# Patient Record
Sex: FEMALE | Race: WHITE | NOT HISPANIC OR LATINO | Employment: OTHER | URBAN - METROPOLITAN AREA
[De-identification: names, ages, dates, MRNs, and addresses within clinical notes are randomized per-mention and may not be internally consistent; named-entity substitution may affect disease eponyms.]

---

## 2023-08-03 ENCOUNTER — PATIENT MESSAGE (OUTPATIENT)
Dept: RESEARCH | Facility: HOSPITAL | Age: 73
End: 2023-08-03

## 2024-06-03 ENCOUNTER — OFFICE VISIT (OUTPATIENT)
Dept: PULMONOLOGY | Facility: CLINIC | Age: 74
End: 2024-06-03
Payer: MEDICARE

## 2024-06-03 ENCOUNTER — CLINICAL SUPPORT (OUTPATIENT)
Dept: PULMONOLOGY | Facility: CLINIC | Age: 74
End: 2024-06-03
Payer: MEDICARE

## 2024-06-03 ENCOUNTER — HOSPITAL ENCOUNTER (OUTPATIENT)
Dept: RADIOLOGY | Facility: HOSPITAL | Age: 74
Discharge: HOME | End: 2024-06-03
Attending: PHYSICIAN ASSISTANT
Payer: MEDICARE

## 2024-06-03 VITALS
SYSTOLIC BLOOD PRESSURE: 118 MMHG | OXYGEN SATURATION: 97 % | BODY MASS INDEX: 31 KG/M2 | RESPIRATION RATE: 20 BRPM | WEIGHT: 168.44 LBS | DIASTOLIC BLOOD PRESSURE: 76 MMHG | HEIGHT: 62 IN | HEART RATE: 82 BPM

## 2024-06-03 VITALS — HEIGHT: 62 IN | WEIGHT: 168.44 LBS | BODY MASS INDEX: 31 KG/M2

## 2024-06-03 DIAGNOSIS — R09.02 EXERCISE HYPOXEMIA: ICD-10-CM

## 2024-06-03 DIAGNOSIS — J30.89 SEASONAL ALLERGIC RHINITIS DUE TO OTHER ALLERGIC TRIGGER: ICD-10-CM

## 2024-06-03 DIAGNOSIS — J84.9 INTERSTITIAL PULMONARY DISEASE, UNSPECIFIED: ICD-10-CM

## 2024-06-03 DIAGNOSIS — J40 BRONCHITIS: ICD-10-CM

## 2024-06-03 DIAGNOSIS — J84.10 PULMONARY INTERSTITIAL FIBROSIS: ICD-10-CM

## 2024-06-03 DIAGNOSIS — J84.9 INTERSTITIAL PULMONARY DISEASE, UNSPECIFIED: Primary | ICD-10-CM

## 2024-06-03 PROCEDURE — 99214 OFFICE O/P EST MOD 30 MIN: CPT | Mod: 25,S$PBB,, | Performed by: INTERNAL MEDICINE

## 2024-06-03 PROCEDURE — 71046 X-RAY EXAM CHEST 2 VIEWS: CPT | Mod: 26,,, | Performed by: RADIOLOGY

## 2024-06-03 PROCEDURE — 99214 OFFICE O/P EST MOD 30 MIN: CPT | Mod: PBBFAC,25,27 | Performed by: INTERNAL MEDICINE

## 2024-06-03 PROCEDURE — 99211 OFF/OP EST MAY X REQ PHY/QHP: CPT | Mod: PBBFAC,25

## 2024-06-03 PROCEDURE — 99999 PR PBB SHADOW E&M-EST. PATIENT-LVL IV: CPT | Mod: PBBFAC,,, | Performed by: INTERNAL MEDICINE

## 2024-06-03 PROCEDURE — 94618 PULMONARY STRESS TESTING: CPT | Mod: PBBFAC

## 2024-06-03 PROCEDURE — 71046 X-RAY EXAM CHEST 2 VIEWS: CPT | Mod: TC

## 2024-06-03 PROCEDURE — 99999 PR PBB SHADOW E&M-EST. PATIENT-LVL I: CPT | Mod: PBBFAC,,,

## 2024-06-03 RX ORDER — ALBUTEROL SULFATE 90 UG/1
2 AEROSOL, METERED RESPIRATORY (INHALATION) EVERY 6 HOURS PRN
Qty: 18 G | Refills: 11 | Status: SHIPPED | OUTPATIENT
Start: 2024-06-03

## 2024-06-03 RX ORDER — AZELASTINE 1 MG/ML
2 SPRAY, METERED NASAL 2 TIMES DAILY
Qty: 30 ML | Refills: 11 | Status: SHIPPED | OUTPATIENT
Start: 2024-06-03 | End: 2025-06-03

## 2024-06-03 NOTE — PROCEDURES
"[facility] [place]  Six Minute Walk     SUMMARY     Ordering Provider:    Interpreting Provider:   Performing nurse/tech/RT: COLIN Liu RRT  Diagnosis:  (Exercise Hypoxemia, Interstitial Pulmonary Disease)  Height: 5' 2" (157.5 cm)  Weight: 76.4 kg (168 lb 6.9 oz)  BMI (Calculated): 30.8   Patient Race:             Phase Oxygen Assessment Supplemental O2 Heart   Rate Blood Pressure Katie Dyspnea Scale Rating   Resting 97 % Room Air 82 bpm 118/76 0   Exercise        Minute        1 97 % Room Air 108 bpm     2 96 % Room Air 119 bpm     3 96 % Room Air 120 bpm     4 96 % Room Air 119 bpm     5 96 % Room Air 117 bpm     6  96 % Room Air 120 bpm 151/87 1   Recovery        Minute        1 97 % Room Air 104 bpm     2 97 % Room Air 97 bpm     3 98 % Room Air 96 bpm     4 97 % Room Air 100 bpm 147/73 0     Six Minute Walk Summary  6MWT Status: completed without stopping  Patient Reported: Dyspnea     Interpretation:  Did the patient stop or pause?: No                                         Total Time Walked (Calculated): 360 seconds  Final Partial Lap Distance (feet): 175 feet  Total Distance Meters (Calculated): 419.1 meters  Predicted Distance Meters (Calculated): 390.87 meters  Percentage of Predicted (Calculated): 107.22  Peak VO2 (Calculated): 16.55  Mets: 4.73  Has The Patient Had a Previous Six Minute Walk Test?: No       Previous 6MWT Results  Has The Patient Had a Previous Six Minute Walk Test?: No    Interpretation:  Total distance walked in six minutes is normal indicating normal functional capacity.   Patient did not meet criteria for oxygen prescription. Clinical correlation suggested.    [] Mild exercise-induced hypoxemia described as an arterial oxygen saturation of 93-95% (or 3-4% less than at rest),  [] Moderate exercise-induced hypoxemia as 89-93%  [] Severe exercise induced hypoxemia as < 89% O2 saturation.  Medicare Criteria for oxygen prescription comments:   When " arterial oxygen saturation is at or below 88% during exercise on room air (severe exercise induced hypoxemia) then the patient falls under Medicare Group 1 criteria for supplemental oxygen prescription.  Details about Medicare Group Criteria coverage can be found at http://www.cms.hhs.gov/manuals/downloads/     Mahendra Steele MD

## 2024-06-03 NOTE — PROGRESS NOTES
Subjective:     Patient ID: Jaymie Rawls is a 73 y.o. female.    Chief Complaint:      HPI 73 y.o.  nonsmoker with history of hypertension, osteopenia and dyspnea.  Secondary smoke exposure throughout her life - none recently    Dyspnea  Patient complains of shortness of breath. Symptoms occur after one flight stairs, with one block walking. Symptoms began 4 years ago, gradually worsening since. Associated symptoms include  difficulty breathing, dry cough, dyspnea on exertion, productive cough, shortness of breath, and sputum production. She denies chest pain, located left chest. She does not have had recent travel. Weight has been stable. Symptoms are exacerbated by any exercise and moderate activity. Symptoms are alleviated by rest.   C/o phlegm production  Chest aches  Coughing  Mucinex and antibiotics have helped    Seen by Dr. Kasper in the past at [facility]   Possible interstitial lung disease - unable to locate records    Past Medical History:   Diagnosis Date    Allergy to environmental factors     Unknown, patient has Epi pen    Back problem     Bronchitis     Chicken pox     COVID-19 2021    & 2023    Essential (primary) hypertension     Fibula fracture     skiing accident (approx 50 yrs ago)    Hemorrhoids     Hives     HPV (human papilloma virus) infection     detected in a blood test 3 yrs ago, non significnat type, no treatment needed    HTN (hypertension)     Measles without complication     Mumps without complication     Osteopenia     Pneumonia, unspecified organism     Recurrent UTI     Unspecified cataract     Unspecified chronic bronchitis      Past Surgical History:   Procedure Laterality Date    ADENOIDECTOMY      age 7    dental inplants      EYE SURGERY      TONSILLECTOMY      age 7     Review of patient's allergies indicates:   Allergen Reactions    Statins-hmg-coa reductase inhibitors      Leg/ Muscle Cramps     Current Outpatient Medications on File  Prior to Visit   Medication Sig Dispense Refill    amoxicillin-clavulanate 875-125mg (AUGMENTIN) 875-125 mg per tablet Take 1 tablet by mouth every 12 (twelve) hours. 20 tablet 0    benzonatate (TESSALON) 100 MG capsule Take 1 capsule (100 mg total) by mouth 3 (three) times daily as needed for Cough. 20 capsule 0    doxycycline (VIBRAMYCIN) 100 MG Cap Take 1 capsule (100 mg total) by mouth 2 (two) times daily. 20 capsule 0    ezetimibe (ZETIA) 10 mg tablet Take 10 mg by mouth every morning.      hydroCHLOROthiazide (HYDRODIURIL) 25 MG tablet Take 25 mg by mouth once daily.      inhalation spacing device Use as directed for inhalation. 1 each 0    losartan (COZAAR) 100 MG tablet Take 100 mg by mouth once daily.      promethazine-dextromethorphan (PROMETHAZINE-DM) 6.25-15 mg/5 mL Syrp Take 5 mLs by mouth every 6 (six) hours as needed (cough). 240 mL 0    triamcinolone acetonide 0.1% (KENALOG) 0.1 % cream Apply daily to legs as needed for itching 80 g 0    [DISCONTINUED] albuterol (VENTOLIN HFA) 90 mcg/actuation inhaler Inhale 2 puffs into the lungs every 6 (six) hours as needed for Wheezing. Rescue please provide a spacer 18 g 0    [DISCONTINUED] fluticasone propionate (FLONASE) 50 mcg/actuation nasal spray 1 spray (50 mcg total) by Each Nostril route once daily. 18.2 mL 0     No current facility-administered medications on file prior to visit.     Social History     Socioeconomic History    Marital status:    Tobacco Use    Smoking status: Never    Smokeless tobacco: Never   Substance and Sexual Activity    Alcohol use: Yes     Comment: wine    Drug use: Never    Sexual activity: Yes     Partners: Male     Birth control/protection: None     Family History   Problem Relation Name Age of Onset    Diabetes Mother      Hypertension Mother      Colon cancer Mother      Heart disease Mother      Ulcers Mother      Diabetes Father      Hypertension Father      Cancer Father      Allergies Father      Anemia Father    "   Depression Father      Hyperlipidemia Father      Kidney disease Father      Gout Father      Epilepsy Sister      Migraines Maternal Aunt      Glaucoma Maternal Grandmother      Dementia Paternal Grandfather         Review of Systems   Constitutional:  Positive for fatigue. Negative for fever.   HENT:  Positive for postnasal drip, rhinorrhea and congestion.    Eyes:  Negative for redness and itching.   Respiratory:  Positive for cough, sputum production, shortness of breath, dyspnea on extertion, use of rescue inhaler and Paroxysmal Nocturnal Dyspnea.    Cardiovascular:  Negative for chest pain, palpitations and leg swelling.   Genitourinary:  Negative for difficulty urinating and hematuria.   Endocrine:  Negative for cold intolerance and heat intolerance.    Skin:  Negative for rash.   Gastrointestinal:  Negative for nausea and abdominal pain.   Neurological:  Negative for dizziness, syncope, weakness and light-headedness.   Hematological:  Negative for adenopathy. Does not bruise/bleed easily.   Psychiatric/Behavioral:  Negative for sleep disturbance. The patient is not nervous/anxious.        Objective:      /76   Pulse 82   Resp 20   Ht 5' 2" (1.575 m)   Wt 76.4 kg (168 lb 6.9 oz)   SpO2 97%   BMI 30.81 kg/m²   Physical Exam  Vitals and nursing note reviewed.   Constitutional:       Appearance: She is well-developed.   HENT:      Head: Normocephalic and atraumatic.      Nose: Nose normal.      Mouth/Throat:      Pharynx: No oropharyngeal exudate.   Eyes:      Conjunctiva/sclera: Conjunctivae normal.      Pupils: Pupils are equal, round, and reactive to light.   Neck:      Thyroid: No thyromegaly.      Vascular: No JVD.      Trachea: No tracheal deviation.   Cardiovascular:      Rate and Rhythm: Normal rate and regular rhythm.      Heart sounds: Normal heart sounds.   Pulmonary:      Effort: Pulmonary effort is normal. No respiratory distress.      Breath sounds: Examination of the right-lower " "field reveals wheezing. Examination of the left-lower field reveals wheezing. Decreased breath sounds and wheezing present. No rhonchi or rales.   Chest:      Chest wall: No tenderness.   Abdominal:      General: Bowel sounds are normal.      Palpations: Abdomen is soft.   Musculoskeletal:         General: Normal range of motion.      Cervical back: Neck supple.   Lymphadenopathy:      Cervical: No cervical adenopathy.   Skin:     General: Skin is warm and dry.   Neurological:      Mental Status: She is alert and oriented to person, place, and time.       Personal Diagnostic Review  Chest x-ray: stable          6/3/2024     3:59 PM   Pulmonary Studies Review   Ordering Provider    Interpreting Provider    Performing nurse/tech/RT COLIN Liu, RRT   Height 5' 2" (1.575 m)   Weight 76.4 kg (168 lb 6.9 oz)   BMI (Calculated) 30.8   Predicted Distance 248.56   Patient Race    6MWT Status completed without stopping   Patient Reported Dyspnea   Was O2 used? No   6MW Distance walked (feet) 1375 feet   Distance walked (meters) 419.1 meters   Did patient stop? No   Type of assistive device(s) used? no assistive devices   Is extra documentation required for this patient? Yes   Oxygen Saturation 97 %   Supplemental Oxygen Room Air   Heart Rate 82 bpm   Blood Pressure 118/76   Katie Dyspnea Rating  nothing at all   Oxygen Saturation 96 %   Supplemental Oxygen Room Air   Heart Rate 120 bpm   Blood Pressure 151/87   Katie Dyspnea Rating  very light   Recovery Time (seconds) 240 seconds   Oxygen Saturation 97 %   Supplemental Oxygen Room Air   Heart Rate 100 bpm   Blood Pressure 147/73   Katie Dyspnea Rating  nothing at all   Is procedure ready for interpretation? Yes   Did the patient stop or pause? No   Total Time Walked (Calculated) 360 seconds   Total Laps Walked 6   Final Partial Lap Distance (feet) 175 feet   Total Distance Feet (Calculated) 1375 feet   Total Distance Meters (Calculated) 419.1 meters " "  Predicted Distance Meters (Calculated) 390.87 meters   Percentage of Predicted (Calculated) 107.22   Peak VO2 (Calculated) 16.55   Mets 4.73   Has The Patient Had a Previous Six Minute Walk Test? No   Oxygen Qualification? No       X-Ray Chest PA And Lateral  Narrative: EXAM: XR CHEST PA AND LATERAL    CLINICAL HISTORY:   [J40]-Bronchitis, not specified as acute or chronic./[R05.2]-Subacute cough.    COMPARISON:  None available.    FINDINGS:  2 view chest.  Mediastinal silhouette is within normal limits.  Prominent bronchovascular structures.  No consolidation.  No pneumothorax or pleural effusion.  No acute osseous finding.  Impression: Prominent bronchovascular structures which may represent chronic lung disease and/or bronchitis.    Finalized on: 6/3/2024 2:41 PM By:  Martínez Thompson MD  BRR# [id number]      2024-06-03 14:43:[id number]    [facility]      Office Spirometry Results:         6/3/2024     3:59 PM 6/3/2024     2:40 PM 5/28/2024     9:23 AM 4/4/2024     8:16 AM 6/29/2023    11:51 AM 6/23/2022    10:48 AM   Pulmonary Function Tests   SpO2  97 % 96 % 97 %     Ordering Provider         Performing nurse/tech/RT COLIN Liu, RRT        Height 5' 2" (1.575 m) 5' 2" (1.575 m) 5' 2" (1.575 m) 5' 2" (1.575 m) 5' 2" (1.575 m) 5' 2" (1.575 m)   Weight 76.4 kg (168 lb 6.9 oz) 76.4 kg (168 lb 6.9 oz) 76.6 kg (168 lb 12.8 oz) 76.7 kg (169 lb 3.2 oz) 75.8 kg (167 lb) 73 kg (161 lb)   BMI (Calculated) 30.8 30.8 30.9 30.9 30.5 29.4   Patient Race         6MWT Status completed without stopping        Patient Reported Dyspnea        Was O2 used? No        6MW Distance walked (feet) 1375 feet        Distance walked (meters) 419.1 meters        Did patient stop? No        Type of assistive device(s) used? no assistive devices        Oxygen Saturation 97 %        Supplemental Oxygen Room Air        Heart Rate 82 bpm        Blood Pressure 118/76        Katie Dyspnea Rating  nothing at all        Oxygen Saturation 96 %      " "  Supplemental Oxygen Room Air        Heart Rate 120 bpm        Blood Pressure 151/87        Katie Dyspnea Rating  very light        Recovery Time (seconds) 240 seconds        Oxygen Saturation 97 %        Supplemental Oxygen Room Air        Heart Rate 100 bpm        Blood Pressure 147/73        Katie Dyspnea Rating  nothing at all        Is procedure ready for interpretation? Yes        Oxygen Qualification? No              6/3/2024     3:59 PM   Pulmonary Studies Review   Ordering Provider    Interpreting Provider    Performing nurse/tech/RT COLIN Liu, RRT   Height 5' 2" (1.575 m)   Weight 76.4 kg (168 lb 6.9 oz)   BMI (Calculated) 30.8   Predicted Distance 248.56   Patient Race    6MWT Status completed without stopping   Patient Reported Dyspnea   Was O2 used? No   6MW Distance walked (feet) 1375 feet   Distance walked (meters) 419.1 meters   Did patient stop? No   Type of assistive device(s) used? no assistive devices   Is extra documentation required for this patient? Yes   Oxygen Saturation 97 %   Supplemental Oxygen Room Air   Heart Rate 82 bpm   Blood Pressure 118/76   Katie Dyspnea Rating  nothing at all   Oxygen Saturation 96 %   Supplemental Oxygen Room Air   Heart Rate 120 bpm   Blood Pressure 151/87   Katie Dyspnea Rating  very light   Recovery Time (seconds) 240 seconds   Oxygen Saturation 97 %   Supplemental Oxygen Room Air   Heart Rate 100 bpm   Blood Pressure 147/73   Katie Dyspnea Rating  nothing at all   Is procedure ready for interpretation? Yes   Did the patient stop or pause? No   Total Time Walked (Calculated) 360 seconds   Total Laps Walked 6   Final Partial Lap Distance (feet) 175 feet   Total Distance Feet (Calculated) 1375 feet   Total Distance Meters (Calculated) 419.1 meters   Predicted Distance Meters (Calculated) 390.87 meters   Percentage of Predicted (Calculated) 107.22   Peak VO2 (Calculated) 16.55   Mets 4.73   Has The Patient Had a Previous Six Minute Walk Test? " "No   Oxygen Qualification? No           No results found for this or any previous visit (from the past 336 hour(s)).  [facility] [place]  Six Minute Walk      SUMMARY      Ordering Provider:    Interpreting Provider:   Performing nurse/tech/RT: COLIN Liu RRT  Diagnosis:  (Exercise Hypoxemia, Interstitial Pulmonary Disease)  Height: 5' 2" (157.5 cm)  Weight: 76.4 kg (168 lb 6.9 oz)  BMI (Calculated): 30.8              Patient Race:                                                                Phase Oxygen Assessment Supplemental O2 Heart   Rate Blood Pressure Katie Dyspnea Scale Rating   Resting 97 % Room Air 82 bpm 118/76 0   Exercise             Minute             1 97 % Room Air 108 bpm       2 96 % Room Air 119 bpm       3 96 % Room Air 120 bpm       4 96 % Room Air 119 bpm       5 96 % Room Air 117 bpm       6  96 % Room Air 120 bpm 151/87 1   Recovery             Minute             1 97 % Room Air 104 bpm       2 97 % Room Air 97 bpm       3 98 % Room Air 96 bpm       4 97 % Room Air 100 bpm 147/73 0      Six Minute Walk Summary  6MWT Status: completed without stopping  Patient Reported: Dyspnea         Interpretation:  Did the patient stop or pause?: No  Total Time Walked (Calculated): 360 seconds  Final Partial Lap Distance (feet): 175 feet  Total Distance Meters (Calculated): 419.1 meters  Predicted Distance Meters (Calculated): 390.87 meters  Percentage of Predicted (Calculated): 107.22  Peak VO2 (Calculated): 16.55  Mets: 4.73  Has The Patient Had a Previous Six Minute Walk Test?: No     Previous 6MWT Results  Has The Patient Had a Previous Six Minute Walk Test?: No  Interpretation:  Total distance walked in six minutes is normal indicating normal functional capacity.   Patient did not meet criteria for oxygen prescription. Clinical correlation suggested.    Medicare Criteria for oxygen prescription comments:   When arterial oxygen saturation is at or below 88% " during exercise on room air (severe exercise induced hypoxemia) then the patient falls under Medicare Group 1 criteria for supplemental oxygen prescription.  Details about Medicare Group Criteria coverage can be found at http://www.cms.[facility].gov/manuals/downloads/     Mahendra Steele MD        Assessment:            Interstitial pulmonary disease, unspecified  -     CT Chest Without Contrast; Future; Expected date: 06/03/2024  -     Complete PFT with bronchodilator; Future; Expected date: 06/03/2024  -     Six Minute Walk Test to qualify for Home Oxygen; Future    Pulmonary interstitial fibrosis  -     Ambulatory referral/consult to Pulmonology    Exercise hypoxemia  -     Six Minute Walk Test to qualify for Home Oxygen; Future    Bronchitis  -     albuterol (VENTOLIN HFA) 90 mcg/actuation inhaler; Inhale 2 puffs into the lungs every 6 (six) hours as needed for Wheezing. Rescue please provide a spacer  Dispense: 18 g; Refill: 11    Seasonal allergic rhinitis due to other allergic trigger  -     azelastine (ASTELIN) 137 mcg (0.1 %) nasal spray; 2 sprays (274 mcg total) by Nasal route 2 (two) times daily.  Dispense: 30 mL; Refill: 11          Outpatient Encounter Medications as of 6/3/2024   Medication Sig Dispense Refill    amoxicillin-clavulanate 875-125mg (AUGMENTIN) 875-125 mg per tablet Take 1 tablet by mouth every 12 (twelve) hours. 20 tablet 0    benzonatate (TESSALON) 100 MG capsule Take 1 capsule (100 mg total) by mouth 3 (three) times daily as needed for Cough. 20 capsule 0    doxycycline (VIBRAMYCIN) 100 MG Cap Take 1 capsule (100 mg total) by mouth 2 (two) times daily. 20 capsule 0    ezetimibe (ZETIA) 10 mg tablet Take 10 mg by mouth every morning.      hydroCHLOROthiazide (HYDRODIURIL) 25 MG tablet Take 25 mg by mouth once daily.      inhalation spacing device Use as directed for inhalation. 1 each 0    losartan (COZAAR) 100 MG tablet Take 100 mg by mouth once daily.      promethazine-dextromethorphan  (PROMETHAZINE-DM) 6.25-15 mg/5 mL Syrp Take 5 mLs by mouth every 6 (six) hours as needed (cough). 240 mL 0    triamcinolone acetonide 0.1% (KENALOG) 0.1 % cream Apply daily to legs as needed for itching 80 g 0    [DISCONTINUED] albuterol (VENTOLIN HFA) 90 mcg/actuation inhaler Inhale 2 puffs into the lungs every 6 (six) hours as needed for Wheezing. Rescue please provide a spacer 18 g 0    [DISCONTINUED] fluticasone propionate (FLONASE) 50 mcg/actuation nasal spray 1 spray (50 mcg total) by Each Nostril route once daily. 18.2 mL 0    albuterol (VENTOLIN HFA) 90 mcg/actuation inhaler Inhale 2 puffs into the lungs every 6 (six) hours as needed for Wheezing. Rescue please provide a spacer 18 g 11    azelastine (ASTELIN) 137 mcg (0.1 %) nasal spray 2 sprays (274 mcg total) by Nasal route 2 (two) times daily. 30 mL 11     No facility-administered encounter medications on file as of 6/3/2024.     Plan:       Requested Prescriptions     Signed Prescriptions Disp Refills    albuterol (VENTOLIN HFA) 90 mcg/actuation inhaler 18 g 11     Sig: Inhale 2 puffs into the lungs every 6 (six) hours as needed for Wheezing. Rescue please provide a spacer    azelastine (ASTELIN) 137 mcg (0.1 %) nasal spray 30 mL 11     Si sprays (274 mcg total) by Nasal route 2 (two) times daily.     Problem List Items Addressed This Visit    None  Visit Diagnoses       Interstitial pulmonary disease, unspecified    -  Primary    Relevant Orders    CT Chest Without Contrast    Complete PFT with bronchodilator    Six Minute Walk Test to qualify for Home Oxygen (Completed)    Pulmonary interstitial fibrosis        Exercise hypoxemia        Relevant Orders    Six Minute Walk Test to qualify for Home Oxygen (Completed)    Bronchitis        Relevant Medications    albuterol (VENTOLIN HFA) 90 mcg/actuation inhaler    Seasonal allergic rhinitis due to other allergic trigger        Relevant Medications    azelastine (ASTELIN) 137 mcg (0.1 %) nasal spray                Follow up in about 3 weeks (around 6/24/2024) for CT - ASAP, 6 min walk - ASAP, PFT -return.    MEDICAL DECISION MAKING: Moderate to high complexity.  Overall, the multiple problems listed are of moderate to high severity that may impact quality of life and activities of daily living. Side effects of medications, treatment plan as well as options and alternatives reviewed and discussed with patient. There was counseling of patient concerning these issues.    Total time spent in counseling and coordination of care -30  minutes of total time spent on the encounter, which includes face to face time and non-face to face time preparing to see the patient (eg, review of tests), Obtaining and/or reviewing separately obtained history, Documenting clinical information in the electronic or other health record, Independently interpreting results (not separately reported) and communicating results to the patient/family/caregiver, or Care coordination (not separately reported).    Time was used in discussion of prognosis, risks, benefits of treatment, instructions and compliance with regimen . Discussion with other physicians and/or health care providers - home health or for use of durable medical equipment (oxygen, nebulizers, CPAP, BiPAP) occurred.

## 2024-06-04 PROCEDURE — 94618 PULMONARY STRESS TESTING: CPT | Mod: 26,S$PBB,, | Performed by: INTERNAL MEDICINE

## 2024-06-06 ENCOUNTER — HOSPITAL ENCOUNTER (OUTPATIENT)
Dept: RADIOLOGY | Facility: HOSPITAL | Age: 74
Discharge: HOME | End: 2024-06-06
Attending: INTERNAL MEDICINE
Payer: MEDICARE

## 2024-06-06 DIAGNOSIS — J84.9 INTERSTITIAL PULMONARY DISEASE, UNSPECIFIED: ICD-10-CM

## 2024-06-06 PROCEDURE — 71250 CT THORAX DX C-: CPT | Mod: TC

## 2024-06-06 PROCEDURE — 71250 CT THORAX DX C-: CPT | Mod: 26,,, | Performed by: RADIOLOGY

## 2024-06-10 ENCOUNTER — PATIENT MESSAGE (OUTPATIENT)
Dept: PULMONOLOGY | Facility: CLINIC | Age: 74
End: 2024-06-10
Payer: MEDICARE

## 2024-06-24 PROBLEM — I10 HYPERTENSION: Status: ACTIVE | Noted: 2024-06-24

## 2024-06-24 PROBLEM — G89.29 CHRONIC PAIN OF BOTH SHOULDERS: Chronic | Status: ACTIVE | Noted: 2024-01-31

## 2024-06-24 PROBLEM — R06.09 DYSPNEA ON EXERTION: Chronic | Status: ACTIVE | Noted: 2022-07-06

## 2024-06-24 PROBLEM — E78.5 HYPERLIPIDEMIA: Status: ACTIVE | Noted: 2018-05-08

## 2024-06-24 PROBLEM — Z12.31 SCREENING MAMMOGRAM, ENCOUNTER FOR: Status: ACTIVE | Noted: 2017-06-01

## 2024-06-24 PROBLEM — M85.851 OSTEOPENIA OF RIGHT HIP: Status: ACTIVE | Noted: 2018-11-05

## 2024-06-24 PROBLEM — M25.511 CHRONIC PAIN OF BOTH SHOULDERS: Chronic | Status: ACTIVE | Noted: 2024-01-31

## 2024-06-24 PROBLEM — M25.512 CHRONIC PAIN OF BOTH SHOULDERS: Chronic | Status: ACTIVE | Noted: 2024-01-31

## 2024-07-25 ENCOUNTER — OFFICE VISIT (OUTPATIENT)
Dept: PULMONOLOGY | Facility: CLINIC | Age: 74
End: 2024-07-25
Payer: MEDICARE

## 2024-07-25 ENCOUNTER — CLINICAL SUPPORT (OUTPATIENT)
Dept: PULMONOLOGY | Facility: CLINIC | Age: 74
End: 2024-07-25
Payer: MEDICARE

## 2024-07-25 VITALS
OXYGEN SATURATION: 95 % | HEART RATE: 94 BPM | SYSTOLIC BLOOD PRESSURE: 128 MMHG | RESPIRATION RATE: 18 BRPM | DIASTOLIC BLOOD PRESSURE: 74 MMHG | HEIGHT: 62 IN | WEIGHT: 172.19 LBS | BODY MASS INDEX: 31.68 KG/M2

## 2024-07-25 DIAGNOSIS — J40 BRONCHITIS: ICD-10-CM

## 2024-07-25 DIAGNOSIS — J84.9 INTERSTITIAL PULMONARY DISEASE, UNSPECIFIED: ICD-10-CM

## 2024-07-25 DIAGNOSIS — J21.9 BRONCHIOLITIS: Primary | ICD-10-CM

## 2024-07-25 LAB
BRPFT: ABNORMAL
DLCO ADJ PRE: 16.68 ML/(MIN*MMHG) (ref 13.58–25.04)
DLCO SINGLE BREATH LLN: 13.58
DLCO SINGLE BREATH PRE REF: 85.6 %
DLCO SINGLE BREATH REF: 19.31
DLCOC SBVA LLN: 2.66
DLCOC SBVA PRE REF: 106 %
DLCOC SBVA REF: 4.19
DLCOC SINGLE BREATH LLN: 13.58
DLCOC SINGLE BREATH PRE REF: 86.4 %
DLCOC SINGLE BREATH REF: 19.31
DLCOVA LLN: 2.66
DLCOVA PRE REF: 105 %
DLCOVA PRE: 4.4 ML/(MIN*MMHG*L) (ref 2.66–5.73)
DLCOVA REF: 4.19
DLVAADJ PRE: 4.44 ML/(MIN*MMHG*L) (ref 2.66–5.73)
ERV LLN: -16449.45
ERV PRE REF: 49.4 %
ERV REF: 0.55
FEF 25 75 CHG: -9.1 %
FEF 25 75 LLN: 0.94
FEF 25 75 POST REF: 62.6 %
FEF 25 75 PRE REF: 68.8 %
FEF 25 75 REF: 2.34
FET100 CHG: 10.7 %
FEV1 CHG: -0.5 %
FEV1 FVC CHG: -1.7 %
FEV1 FVC LLN: 64
FEV1 FVC POST REF: 102.8 %
FEV1 FVC PRE REF: 104.6 %
FEV1 FVC REF: 78
FEV1 LLN: 1.36
FEV1 POST REF: 82.1 %
FEV1 PRE REF: 82.5 %
FEV1 REF: 1.91
FRCPLETH LLN: 1.78
FRCPLETH PREREF: 90.3 %
FRCPLETH REF: 2.6
FVC CHG: 1.2 %
FVC LLN: 1.77
FVC POST REF: 79.1 %
FVC PRE REF: 78.2 %
FVC REF: 2.49
IVC PRE: 2.09 L (ref 1.77–3.24)
IVC SINGLE BREATH LLN: 1.77
IVC SINGLE BREATH PRE REF: 84 %
IVC SINGLE BREATH REF: 2.49
MVV LLN: 59
MVV PRE REF: 94.4 %
MVV REF: 70
PEF CHG: 6.2 %
PEF LLN: 3.34
PEF POST REF: 129.4 %
PEF PRE REF: 121.8 %
PEF REF: 4.94
POST FEF 25 75: 1.46 L/S (ref 0.94–3.74)
POST FET 100: 7.6 SEC
POST FEV1 FVC: 79.93 % (ref 63.63–89.92)
POST FEV1: 1.57 L (ref 1.36–2.44)
POST FVC: 1.97 L (ref 1.77–3.24)
POST PEF: 6.39 L/S (ref 3.34–6.55)
PRE DLCO: 16.52 ML/(MIN*MMHG) (ref 13.58–25.04)
PRE ERV: 0.27 L (ref -16449.45–16450.55)
PRE FEF 25 75: 1.61 L/S (ref 0.94–3.74)
PRE FET 100: 6.86 SEC
PRE FEV1 FVC: 81.27 % (ref 63.63–89.92)
PRE FEV1: 1.58 L (ref 1.36–2.44)
PRE FRC PL: 2.35 L (ref 1.78–3.43)
PRE FVC: 1.94 L (ref 1.77–3.24)
PRE MVV: 65.93 L/MIN (ref 59.35–80.29)
PRE PEF: 6.02 L/S (ref 3.34–6.55)
PRE RV: 2.08 L (ref 1.47–2.63)
PRE TLC: 4.05 L (ref 3.62–5.59)
RAW LLN: 3.06
RAW PRE REF: 145.6 %
RAW PRE: 4.45 CMH2O*S/L (ref 3.06–3.06)
RAW REF: 3.06
RV LLN: 1.47
RV PRE REF: 101.3 %
RV REF: 2.05
RVTLC LLN: 35
RVTLC PRE REF: 115.3 %
RVTLC PRE: 51.26 % (ref 34.87–54.05)
RVTLC REF: 44
TLC LLN: 3.62
TLC PRE REF: 88 %
TLC REF: 4.6
VA PRE: 3.75 L (ref 4.45–4.45)
VA SINGLE BREATH LLN: 4.45
VA SINGLE BREATH PRE REF: 84.3 %
VA SINGLE BREATH REF: 4.45
VC LLN: 1.77
VC PRE REF: 79.5 %
VC PRE: 1.98 L (ref 1.77–3.24)
VC REF: 2.49

## 2024-07-25 PROCEDURE — 99999 PR PBB SHADOW E&M-EST. PATIENT-LVL IV: CPT | Mod: PBBFAC,,, | Performed by: INTERNAL MEDICINE

## 2024-07-25 PROCEDURE — 94726 PLETHYSMOGRAPHY LUNG VOLUMES: CPT | Mod: PBBFAC

## 2024-07-25 PROCEDURE — 94060 EVALUATION OF WHEEZING: CPT | Mod: PBBFAC

## 2024-07-25 PROCEDURE — 94729 DIFFUSING CAPACITY: CPT | Mod: PBBFAC

## 2024-07-25 PROCEDURE — 99214 OFFICE O/P EST MOD 30 MIN: CPT | Mod: PBBFAC | Performed by: INTERNAL MEDICINE

## 2024-07-25 RX ORDER — MULTIVITAMIN
1 TABLET ORAL DAILY
COMMUNITY

## 2024-07-25 RX ORDER — ASCORBIC ACID 500 MG
500 TABLET ORAL DAILY
COMMUNITY

## 2024-07-25 RX ORDER — DEXTROMETHORPHAN HYDROBROMIDE, GUAIFENESIN 5; 100 MG/5ML; MG/5ML
650 LIQUID ORAL EVERY 8 HOURS
COMMUNITY

## 2024-07-25 RX ORDER — ALBUTEROL SULFATE 0.83 MG/ML
2.5 SOLUTION RESPIRATORY (INHALATION)
Qty: 360 ML | Refills: 11 | Status: SHIPPED | OUTPATIENT
Start: 2024-07-25 | End: 2025-07-25

## 2024-07-25 RX ORDER — BUTALB/ACETAMINOPHEN/CAFFEINE 50-325-40
1 TABLET ORAL 2 TIMES DAILY
COMMUNITY

## 2024-07-25 NOTE — PROGRESS NOTES
Subjective:     Patient ID: Jaymie Rawls is a 73 y.o. female.    Chief Complaint:      HPI 73 y.o.  nonsmoker with history of hypertension, osteopenia and dyspnea.  Secondary smoke exposure throughout her life - none recently    Dyspnea  Patient complains of shortness of breath. Symptoms occur after one flight stairs, with one block walking. Symptoms began 4 years ago, gradually worsening since. Associated symptoms include  difficulty breathing, dry cough, dyspnea on exertion, productive cough, shortness of breath, and sputum production. She denies chest pain, located left chest. She does not have had recent travel. Weight has been stable. Symptoms are exacerbated by any exercise and moderate activity. Symptoms are alleviated by rest.   Doing well this spring and summer    No C/o phlegm production  NO Chest aches  No Coughing  Occasional cough with phlegm production - last time [facility] - 1- 2 months ago    Seen by Dr. Kasper in the past at [facility]   Possible interstitial lung disease - unable to locate records    Past Medical History:   Diagnosis Date    Allergy to environmental factors     Unknown, patient has Epi pen    Back problem     Bronchitis     Chicken pox     COVID-19 2021    & 2023    Essential (primary) hypertension     Fibula fracture     skiing accident (approx 50 yrs ago)    Hemorrhoids     Hives     HPV (human papilloma virus) infection     detected in a blood test 3 yrs ago, non significnat type, no treatment needed    HTN (hypertension)     Measles without complication     Mumps without complication     Osteopenia     Pneumonia, unspecified organism     Recurrent UTI     Unspecified cataract     Unspecified chronic bronchitis      Past Surgical History:   Procedure Laterality Date    ADENOIDECTOMY      age 7    dental inplants      EYE SURGERY      TONSILLECTOMY      age 7     Review of patient's allergies indicates:   Allergen Reactions     Statins-hmg-coa reductase inhibitors      Leg/ Muscle Cramps     Current Outpatient Medications on File Prior to Visit   Medication Sig Dispense Refill    acetaminophen (TYLENOL) 650 MG TbSR Take 650 mg by mouth every 8 (eight) hours.      albuterol (VENTOLIN HFA) 90 mcg/actuation inhaler Inhale 2 puffs into the lungs every 6 (six) hours as needed for Wheezing. Rescue please provide a spacer 18 g 11    ascorbic acid, vitamin C, (VITAMIN C) 500 MG tablet Take 500 mg by mouth once daily.      azelastine (ASTELIN) 137 mcg (0.1 %) nasal spray 2 sprays (274 mcg total) by Nasal route 2 (two) times daily. 30 mL 11    calcium citrate-vitamin D3 315-200 mg (CITRACAL+D) 315 mg-5 mcg (200 unit) per tablet Take 1 tablet by mouth 2 (two) times daily.      ezetimibe (ZETIA) 10 mg tablet Take 1 tablet (10 mg total) by mouth every morning. 30 tablet 3    hydroCHLOROthiazide (HYDRODIURIL) 25 MG tablet Take 25 mg by mouth once daily.      inhalation spacing device Use as directed for inhalation. 1 each 0    losartan (COZAAR) 100 MG tablet Take 100 mg by mouth once daily.      multivitamin (THERAGRAN) per tablet Take 1 tablet by mouth once daily.      triamcinolone acetonide 0.1% (KENALOG) 0.1 % cream Apply daily to legs as needed for itching 80 g 0     No current facility-administered medications on file prior to visit.     Social History     Socioeconomic History    Marital status:    Tobacco Use    Smoking status: Never    Smokeless tobacco: Never   Substance and Sexual Activity    Alcohol use: Yes     Comment: wine    Drug use: Never    Sexual activity: Yes     Partners: Male     Birth control/protection: None     Family History   Problem Relation Name Age of Onset    Diabetes Mother      Hypertension Mother      Colon cancer Mother      Heart disease Mother      Ulcers Mother      Diabetes Father      Hypertension Father      Cancer Father      Allergies Father      Anemia Father      Depression Father      Hyperlipidemia  "Father      Kidney disease Father      Gout Father      Epilepsy Sister      Multiple sclerosis Brother      Migraines Maternal Aunt      Glaucoma Maternal Grandmother      Dementia Paternal Grandfather         Review of Systems   Constitutional:  Positive for fatigue. Negative for fever.   HENT:  Positive for postnasal drip, rhinorrhea and congestion.    Eyes:  Negative for redness and itching.   Respiratory:  Positive for cough, sputum production, shortness of breath, dyspnea on extertion, use of rescue inhaler and Paroxysmal Nocturnal Dyspnea.    Cardiovascular:  Negative for chest pain, palpitations and leg swelling.   Genitourinary:  Negative for difficulty urinating and hematuria.   Endocrine:  Negative for cold intolerance and heat intolerance.    Skin:  Negative for rash.   Gastrointestinal:  Negative for nausea and abdominal pain.   Neurological:  Negative for dizziness, syncope, weakness and light-headedness.   Hematological:  Negative for adenopathy. Does not bruise/bleed easily.   Psychiatric/Behavioral:  Negative for sleep disturbance. The patient is not nervous/anxious.        Objective:      /74   Pulse 94   Resp 18   Ht 5' 2" (1.575 m)   Wt 78.1 kg (172 lb 2.9 oz)   SpO2 95%   BMI 31.49 kg/m²   Physical Exam  Vitals and nursing note reviewed.   Constitutional:       Appearance: She is well-developed.   HENT:      Head: Normocephalic and atraumatic.      Nose: Nose normal.      Mouth/Throat:      Pharynx: No oropharyngeal exudate.   Eyes:      Conjunctiva/sclera: Conjunctivae normal.      Pupils: Pupils are equal, round, and reactive to light.   Neck:      Thyroid: No thyromegaly.      Vascular: No JVD.      Trachea: No tracheal deviation.   Cardiovascular:      Rate and Rhythm: Normal rate and regular rhythm.      Heart sounds: Normal heart sounds.   Pulmonary:      Effort: Pulmonary effort is normal. No respiratory distress.      Breath sounds: Examination of the right-lower field " "reveals wheezing. Examination of the left-lower field reveals wheezing. Decreased breath sounds and wheezing present. No rhonchi or rales.   Chest:      Chest wall: No tenderness.   Abdominal:      General: Bowel sounds are normal.      Palpations: Abdomen is soft.   Musculoskeletal:         General: Normal range of motion.      Cervical back: Neck supple.   Lymphadenopathy:      Cervical: No cervical adenopathy.   Skin:     General: Skin is warm and dry.   Neurological:      Mental Status: She is alert and oriented to person, place, and time.       Personal Diagnostic Review  Chest x-ray: stable          7/25/2024     2:59 PM   Pulmonary Studies Review   SpO2 95 %   Height 5' 2" (1.575 m)   Weight 78.1 kg (172 lb 2.9 oz)   BMI (Calculated) 31.5   Predicted Distance 244.19   Predicted Distance Meters (Calculated) 386.98 meters       CT Chest Without Contrast  Narrative: EXAM:  CT CHEST WITHOUT CONTRAST    CLINICAL HISTORY:  Interstitial lung disease; [J84.9]-Interstitial pulmonary disease, unspecified.    TECHNIQUE: Routine high-resolution chest CT protocol was performed without contrast. All CT scans at [this location] are performed using dose modulation techniques as appropriate to a performed exam including the following: automated exposure control; adjustment of the mA and/or kV according to patient size (this includes techniques or standardized protocols for targeted exams where dose is matched to indication / reason for exam; i.e. extremities or head); use of iterative reconstruction technique.    Comparison: No prior CT is available for comparison.    FINDINGS:  Patient respiratory motion limits detailed evaluation.  There appear to be tiny subtle tree-in-bud opacities in the right upper lobe best appreciated on images 27-29 series 601.  There is mild mosaic attenuation with suspected mild air trapping.  No bronchiectasis.  There is minor pleural parenchymal scarring in the right lower lobe, right middle lobe " "and left upper lobe.  No evidence of superimposed pulmonary fibrosis or other interstitial lung disease.  No honeycombing. No acute alveolar infiltrates, pleural effusion, pneumothorax or lung masses identified.    No pathologically enlarged mediastinal, hilar or axillary lymph nodes are identified.  No aortic aneurysm is identified.  There is no significant cardiomegaly or pericardial effusion. Atherosclerotic coronary artery calcifications are noted.    No suspicious bone marrow lesions or acute osseous abnormalities identified. Limited images through the upper abdomen demonstrate no acute abnormality.  Impression: Suspected cluster of tiny tree-in-bud opacities in the right upper lobe suggesting bronchiolitis.  Suspected mild air trapping.  No evidence of UIP/IPF or other significant interstitial lung disease.    Finalized on: 6/6/2024 4:27 PM By:  Mario Longo MD  [facility]# [id number]      2024-06-06 16:29:[id number]    [facility]      Office Spirometry Results:         7/25/2024     2:59 PM 6/24/2024     8:27 AM 6/3/2024     3:59 PM 6/3/2024     2:40 PM 5/28/2024     9:23 AM 4/4/2024     8:16 AM 6/29/2023    11:51 AM   Pulmonary Function Tests   SpO2 95 % 97 %  97 % 96 % 97 %    Ordering Provider          Performing nurse/tech/RT   COLIN Liu, RRT       Height 5' 2" (1.575 m) 5' 2" (1.575 m) 5' 2" (1.575 m) 5' 2" (1.575 m) 5' 2" (1.575 m) 5' 2" (1.575 m) 5' 2" (1.575 m)   Weight 78.1 kg (172 lb 2.9 oz) 77.4 kg (170 lb 9.6 oz) 76.4 kg (168 lb 6.9 oz) 76.4 kg (168 lb 6.9 oz) 76.6 kg (168 lb 12.8 oz) 76.7 kg (169 lb 3.2 oz) 75.8 kg (167 lb)   BMI (Calculated) 31.5 31.2 30.8 30.8 30.9 30.9 30.5   Patient Race          6MWT Status   completed without stopping       Patient Reported   Dyspnea       Was O2 used?   No       6MW Distance walked (feet)   1375 feet       Distance walked (meters)   419.1 meters       Did patient stop?   No       Type of assistive device(s) used?   no assistive devices       Oxygen Saturation  " " 97 %       Supplemental Oxygen   Room Air       Heart Rate   82 bpm       Blood Pressure   118/76       Katie Dyspnea Rating    nothing at all       Oxygen Saturation   96 %       Supplemental Oxygen   Room Air       Heart Rate   120 bpm       Blood Pressure   151/87       Katie Dyspnea Rating    very light       Recovery Time (seconds)   240 seconds       Oxygen Saturation   97 %       Supplemental Oxygen   Room Air       Heart Rate   100 bpm       Blood Pressure   147/73       Katie Dyspnea Rating    nothing at all       Is procedure ready for interpretation?   Yes       Oxygen Qualification?   No             7/25/2024     2:59 PM   Pulmonary Studies Review   SpO2 95 %   Height 5' 2" (1.575 m)   Weight 78.1 kg (172 lb 2.9 oz)   BMI (Calculated) 31.5   Predicted Distance 244.19   Predicted Distance Meters (Calculated) 386.98 meters           No results found for this or any previous visit (from the past 336 hour(s)).  [facility] [place]-Pulmonary Function 3rdFl  Six Minute Walk      SUMMARY      Ordering Provider:    Interpreting Provider:   Performing nurse/tech/RT: COLIN Liu, RRT  Diagnosis:  (Exercise Hypoxemia, Interstitial Pulmonary Disease)  Height: 5' 2" (157.5 cm)  Weight: 76.4 kg (168 lb 6.9 oz)  BMI (Calculated): 30.8              Patient Race:                                                                Phase Oxygen Assessment Supplemental O2 Heart   Rate Blood Pressure Katie Dyspnea Scale Rating   Resting 97 % Room Air 82 bpm 118/76 0   Exercise             Minute             1 97 % Room Air 108 bpm       2 96 % Room Air 119 bpm       3 96 % Room Air 120 bpm       4 96 % Room Air 119 bpm       5 96 % Room Air 117 bpm       6  96 % Room Air 120 bpm 151/87 1   Recovery             Minute             1 97 % Room Air 104 bpm       2 97 % Room Air 97 bpm       3 98 % Room Air 96 bpm       4 97 % Room Air 100 bpm 147/73 0      Six Minute Walk Summary  6MWT Status: completed without " stopping  Patient Reported: Dyspnea         Interpretation:  Did the patient stop or pause?: No  Total Time Walked (Calculated): 360 seconds  Final Partial Lap Distance (feet): 175 feet  Total Distance Meters (Calculated): 419.1 meters  Predicted Distance Meters (Calculated): 390.87 meters  Percentage of Predicted (Calculated): 107.22  Peak VO2 (Calculated): 16.55  Mets: 4.73  Has The Patient Had a Previous Six Minute Walk Test?: No     Previous 6MWT Results  Has The Patient Had a Previous Six Minute Walk Test?: No  Interpretation:  Total distance walked in six minutes is normal indicating normal functional capacity.   Patient did not meet criteria for oxygen prescription. Clinical correlation suggested.    Medicare Criteria for oxygen prescription comments:   When arterial oxygen saturation is at or below 88% during exercise on room air (severe exercise induced hypoxemia) then the patient falls under Medicare Group 1 criteria for supplemental oxygen prescription.  Details about Medicare Group Criteria coverage can be found at http://www.cms.[facility].gov/manuals/downloads/     Mahendra Steele MD        Assessment:            Bronchiolitis  -     albuterol (PROVENTIL) 2.5 mg /3 mL (0.083 %) nebulizer solution; Take 3 mLs (2.5 mg total) by nebulization every 4 to 6 hours as needed for Wheezing or Shortness of Breath.  Dispense: 360 mL; Refill: 11  -     NEBULIZER KIT (SUPPLIES) FOR HOME USE  -     NEBULIZER FOR HOME USE  -     X-Ray Chest 4 Or More View; Future; Expected date: 07/25/2024  -     Complete PFT with bronchodilator; Future; Expected date: 07/25/2024    Bronchitis  -     albuterol (PROVENTIL) 2.5 mg /3 mL (0.083 %) nebulizer solution; Take 3 mLs (2.5 mg total) by nebulization every 4 to 6 hours as needed for Wheezing or Shortness of Breath.  Dispense: 360 mL; Refill: 11  -     NEBULIZER KIT (SUPPLIES) FOR HOME USE  -     NEBULIZER FOR HOME USE  -     X-Ray Chest 4 Or More View; Future; Expected date: 07/25/2024  -      Complete PFT with bronchodilator; Future; Expected date: 07/25/2024            Outpatient Encounter Medications as of 7/25/2024   Medication Sig Dispense Refill    acetaminophen (TYLENOL) 650 MG TbSR Take 650 mg by mouth every 8 (eight) hours.      albuterol (VENTOLIN HFA) 90 mcg/actuation inhaler Inhale 2 puffs into the lungs every 6 (six) hours as needed for Wheezing. Rescue please provide a spacer 18 g 11    ascorbic acid, vitamin C, (VITAMIN C) 500 MG tablet Take 500 mg by mouth once daily.      azelastine (ASTELIN) 137 mcg (0.1 %) nasal spray 2 sprays (274 mcg total) by Nasal route 2 (two) times daily. 30 mL 11    calcium citrate-vitamin D3 315-200 mg (CITRACAL+D) 315 mg-5 mcg (200 unit) per tablet Take 1 tablet by mouth 2 (two) times daily.      ezetimibe (ZETIA) 10 mg tablet Take 1 tablet (10 mg total) by mouth every morning. 30 tablet 3    hydroCHLOROthiazide (HYDRODIURIL) 25 MG tablet Take 25 mg by mouth once daily.      inhalation spacing device Use as directed for inhalation. 1 each 0    losartan (COZAAR) 100 MG tablet Take 100 mg by mouth once daily.      multivitamin (THERAGRAN) per tablet Take 1 tablet by mouth once daily.      triamcinolone acetonide 0.1% (KENALOG) 0.1 % cream Apply daily to legs as needed for itching 80 g 0    albuterol (PROVENTIL) 2.5 mg /3 mL (0.083 %) nebulizer solution Take 3 mLs (2.5 mg total) by nebulization every 4 to 6 hours as needed for Wheezing or Shortness of Breath. 360 mL 11    [DISCONTINUED] ezetimibe (ZETIA) 10 mg tablet Take 10 mg by mouth every morning.       No facility-administered encounter medications on file as of 7/25/2024.     Plan:       Requested Prescriptions     Signed Prescriptions Disp Refills    albuterol (PROVENTIL) 2.5 mg /3 mL (0.083 %) nebulizer solution 360 mL 11     Sig: Take 3 mLs (2.5 mg total) by nebulization every 4 to 6 hours as needed for Wheezing or Shortness of Breath.     Problem List Items Addressed This Visit    None  Visit  Diagnoses       Bronchiolitis    -  Primary    Relevant Medications    albuterol (PROVENTIL) 2.5 mg /3 mL (0.083 %) nebulizer solution    Other Relevant Orders    NEBULIZER KIT (SUPPLIES) FOR HOME USE    NEBULIZER FOR HOME USE    X-Ray Chest 4 Or More View    Complete PFT with bronchodilator    Bronchitis        Relevant Medications    albuterol (PROVENTIL) 2.5 mg /3 mL (0.083 %) nebulizer solution    Other Relevant Orders    NEBULIZER KIT (SUPPLIES) FOR HOME USE    NEBULIZER FOR HOME USE    X-Ray Chest 4 Or More View    Complete PFT with bronchodilator                 Follow up in about 1 year (around 7/25/2025) for PFT -return, CXR on return.    MEDICAL DECISION MAKING: Moderate to high complexity.  Overall, the multiple problems listed are of moderate to high severity that may impact quality of life and activities of daily living. Side effects of medications, treatment plan as well as options and alternatives reviewed and discussed with patient. There was counseling of patient concerning these issues.    Total time spent in counseling and coordination of care -30  minutes of total time spent on the encounter, which includes face to face time and non-face to face time preparing to see the patient (eg, review of tests), Obtaining and/or reviewing separately obtained history, Documenting clinical information in the electronic or other health record, Independently interpreting results (not separately reported) and communicating results to the patient/family/caregiver, or Care coordination (not separately reported).    Time was used in discussion of prognosis, risks, benefits of treatment, instructions and compliance with regimen . Discussion with other physicians and/or health care providers - home health or for use of durable medical equipment (oxygen, nebulizers, CPAP, BiPAP) occurred.

## 2024-07-25 NOTE — PATIENT INSTRUCTIONS
Mouth Guard  Mouth guards have many purposes, and can help treat conditions from teeth grinding to sleep apnea. They also protect your mouth from sports-related injury. Mouth guards may be store-bought or custom-made by a dentist.  ContentsOverviewProcedure DetailsRisks / BenefitsRecovery and OutlookWhen To Call the DoctorAdditional Details  ContentsOverviewProcedure DetailsRisks / BenefitsRecovery and OutlookWhen To Call the DoctorAdditional Details  Overview  What is a mouth guard?  Mouth guards are dental appliances that cover your teeth. Dentists recommend them for a number of reasons, and there are many different types. Children and adults alike can benefit from mouth guards.  Most mouth guards fit over your upper teeth. In some instances, your dentist may recommend a mouth guard for your lower teeth, as well.  What does a mouth guard do?  Dentists recommend mouth guards for many different reasons. You might need one if you:  Grind or clench your teeth (bruxism).  Play contact sports such as football, basketball, hockey, soccer or boxing.  Participate in activities with a high fall risk, such as gymnastics, biking or ice skating.  Snore.  Have obstructive sleep apnea.  Have TMJ disorder.  Are there different types of mouth guards?  There are three main types of mouth guards. We can categorize them according to purpose:  Mouth guards for sports. These protect your teeth from sports-related injuries. If you play sports like boxing, wrestling, soccer, basketball, hockey or football, mouth guards can greatly reduce your risk for chipped and avulsed (knocked out) teeth.  Mouth guards for grinding teeth. These appliances help protect your teeth from the effects of grinding and clenching. They may also reduce the effects of TMJ disorders. You can wear this type of mouth guard any time, night or day. But because most people grind or clench their teeth during sleep, its common to wear them at night. (Some people refer  to these appliances as night guards.)  Snoring and sleep apnea mouth guards. People with chronic snoring or obstructive sleep apnea may benefit from a custom mouth guard. This type of appliance shifts and repositions your jaw to open your airway during sleep.  In addition, mouth guards may be store-bought or custom-made:  Store-bought: There are two main types of store-bought mouth guards. Stock (which you wear as-is, right out of the box) and boil-and-bite (which you can slightly customize at home). Store-bought guards are cheaper, but theyre not as effective as custom-made mouth guards for protection against teeth grinding or sports-related injuries. Theyre also not appropriate for treating sleep apnea.  Custom-made: A dentist creates this type of mouth guard to fit your exact dental anatomy. Because theyre designed to fit your teeth, they tend to be more comfortable than store-bought mouth guards. Custom-made mouth guards provide effective protection against bruxism and sports-related trauma. Theyre also a common first line of defense in sleep apnea treatment. You can wear them with or without a CPAP machine. (Custom-made mouth guards are usually more costly, but they last longer compared to store-bought guards.)    Procedure Details  What happens if I need a custom mouth guard?  If you need a custom mouth guard, a dentist will take dental impressions. (Theyjuana use dental putty or a digital handheld wand for this step.) Next, theyll send your impressions to a dental lab, where a technician will fabricate a mouth guard that fits the anatomy of your teeth. In some cases, this process can take up to two weeks.  How often should you wear a mouth guard?  It depends on why you need a mouth guard. If you need one to protect your teeth during contact sports, then you should wear it during all practices and games. However, if you need a mouth guard to treat teeth grinding, snoring or sleep apnea, youll likely need  to wear it every night while you sleep. Ask your healthcare provider if youre unsure about how often you should wear your mouth guard.    Risks / Benefits  What are the advantages of wearing a mouth guard?  Wearing a mouth guard during contact sports and athletic activities can help you avoid:  Chipped teeth.  Knocked-out teeth.  Damage to your tooth pulp.  Soft-tissue injury to your gums, lips and inner cheeks.  Wearing a mouth guard while you sleep can help reduce your risk for:  Wear and tear from clenching or grinding your teeth.  Snoring.  Jaw pain, headaches, facial pain and other symptoms of TMJ disorder.  Sleep apnea and related complications, including high blood pressure and daytime fatigue.  Are there side effects of wearing a mouth guard?  It can take some time to get used to your mouth guard. If you purchase one that doesnt fit quite right, it can cause soreness in your teeth, gums or jaw.  Generally, custom-made mouth guards are more comfortable than store-bought ones. No matter what type of mouth guard you have, your dentist can check it and make any necessary adjustments.    Recovery and [place]  How long do mouth guards last?  Depending on how frequently you wear your appliance, a custom-made mouth guard can last several years with proper care. However, some people may find they need a mouth guard replaced more often than that. Be sure to bring your mouth guard with you to dental checkups so your dentist can inspect it for cracks or other signs of wear.  Store-bought mouth guards arent as durable. You may need to replace them a few times a year. Children and teens may need to replace mouth guards more often as their teeth and mouth grow.  How do I care for a mouth guard?  Mouth guards  bacteria from your mouth. Be sure to clean your teeth thoroughly before putting a mouth guard in. Here are some tips for how to clean your mouth guard:  Dont expose your mouth guard to extreme heat, such as  direct sunlight or hot water. Heat can cause it to warp and change shape.  Keep your mouth guard in a sturdy, vented plastic case when not in use or when traveling to sports and activities.  Rinse your mouth guard in cool water and use a brush and soapy water to clean it after each use. Let it air dry.  Store your mouth guard out of reach of dogs and other pets.    When To Call the Doctor  When should I call my healthcare provider?  If you play contact sports, ask your dentist for mouth guard recommendations. You should also reach out to your healthcare provider if you have:  Wear and tear from teeth grinding.  Symptoms of TMJ disorder, such as jaw pain, chronic headaches or facial pain.  Symptoms of obstructive sleep apnea, such as daytime fatigue, headaches, irritability or gasping for air during sleep.    Additional Details  Can I wear a mouth guard if I have braces or dental implants?  Yes. In addition to protecting your teeth, a mouth guard can protect braces, dental implants and other dental restorations (like crowns and bridges) from damage. Custom-fitted mouth guards work best. They fit the unique shape of braces or implants.  Does insurance cover the cost of mouth guards?  Some dental health insurers cover part or all of the cost for custom-fitted mouth guards. Health insurance policies vary, so you should check with your provider. You can also see if your dental office has a payment plan. You can use health savings account funds to pay for custom-fitted and store-bought mouth guards.  A note from [facility]  Mouth guards protect your teeth from injury when you play sports, bike or do other on-the-move activities. You can buy mouth guards at stores or get a custom-fitted mouth guard from a dentist. Your dentist may recommend wearing a mouth guard while you sleep to stop you from grinding your teeth or improve sleep apnea symptoms. There are different types of mouth guards. Your dentist can suggest the  best one for you.      Medically Reviewed  Last reviewed on 01/10/2023.  Learn more about our editorial process.  References

## 2024-10-16 ENCOUNTER — PATIENT MESSAGE (OUTPATIENT)
Dept: PULMONOLOGY | Facility: CLINIC | Age: 74
End: 2024-10-16
Payer: MEDICARE

## 2025-01-08 DIAGNOSIS — U07.1 COVID-19 VIRUS DETECTED: ICD-10-CM

## 2025-04-15 ENCOUNTER — PATIENT MESSAGE (OUTPATIENT)
Dept: NEUROLOGY | Facility: CLINIC | Age: 75
End: 2025-04-15
Payer: MEDICARE

## 2025-08-06 ENCOUNTER — HOSPITAL ENCOUNTER (OUTPATIENT)
Dept: RADIOLOGY | Facility: HOSPITAL | Age: 75
Discharge: HOME | End: 2025-08-06
Attending: INTERNAL MEDICINE
Payer: MEDICARE

## 2025-08-06 ENCOUNTER — CLINICAL SUPPORT (OUTPATIENT)
Dept: PULMONOLOGY | Facility: CLINIC | Age: 75
End: 2025-08-06
Payer: MEDICARE

## 2025-08-06 DIAGNOSIS — J21.9 BRONCHIOLITIS: ICD-10-CM

## 2025-08-06 DIAGNOSIS — J40 BRONCHITIS: ICD-10-CM

## 2025-08-06 LAB
BRPFT: ABNORMAL
DLCO SINGLE BREATH LLN: 13.43
DLCO SINGLE BREATH PRE REF: 94.7 %
DLCO SINGLE BREATH REF: 19.16
DLCOC SBVA LLN: 2.63
DLCOC SBVA REF: 4.16
DLCOC SINGLE BREATH LLN: 13.43
DLCOC SINGLE BREATH REF: 19.16
DLCOVA LLN: 2.63
DLCOVA PRE REF: 122.7 %
DLCOVA PRE: 5.11 ML/(MIN*MMHG*L) (ref 2.63–5.7)
DLCOVA REF: 4.16
ERV LLN: -16449.46
ERV PRE REF: 143.5 %
ERV REF: 0.54
FEF 25 75 CHG: 37.5 %
FEF 25 75 LLN: 0.68
FEF 25 75 POST REF: 88.6 %
FEF 25 75 PRE REF: 64.4 %
FEF 25 75 REF: 1.58
FET100 CHG: -32.4 %
FEV1 CHG: -1.2 %
FEV1 FVC CHG: 4.3 %
FEV1 FVC LLN: 63
FEV1 FVC POST REF: 103.5 %
FEV1 FVC PRE REF: 99.2 %
FEV1 FVC REF: 78
FEV1 LLN: 1.34
FEV1 POST REF: 73.3 %
FEV1 PRE REF: 74.1 %
FEV1 REF: 1.89
FRCPLETH LLN: 1.78
FRCPLETH PREREF: 112.6 %
FRCPLETH REF: 2.6
FVC CHG: -5.2 %
FVC LLN: 1.74
FVC POST REF: 70.1 %
FVC PRE REF: 74 %
FVC REF: 2.46
IVC PRE: 1.8 L (ref 1.74–3.21)
IVC SINGLE BREATH LLN: 1.74
IVC SINGLE BREATH PRE REF: 73.3 %
IVC SINGLE BREATH REF: 2.46
MVV LLN: 58
MVV PRE REF: 86 %
MVV REF: 69
PEF CHG: -4 %
PEF LLN: 3.25
PEF POST REF: 113 %
PEF PRE REF: 117.6 %
PEF REF: 4.86
POST FEF 25 75: 1.4 L/S (ref 0.68–2.9)
POST FET 100: 6.5 SEC
POST FEV1 FVC: 80.3 % (ref 63.37–89.94)
POST FEV1: 1.38 L (ref 1.34–2.41)
POST FVC: 1.72 L (ref 1.74–3.21)
POST PEF: 5.49 L/S (ref 3.25–6.46)
PRE DLCO: 18.14 ML/(MIN*MMHG) (ref 13.43–24.9)
PRE ERV: 0.77 L (ref -16449.46–16450.54)
PRE FEF 25 75: 1.02 L/S (ref 0.68–2.9)
PRE FET 100: 9.61 SEC
PRE FEV1 FVC: 77.02 % (ref 63.37–89.94)
PRE FEV1: 1.4 L (ref 1.34–2.41)
PRE FRC PL: 2.93 L (ref 1.78–3.43)
PRE FVC: 1.82 L (ref 1.74–3.21)
PRE MVV: 59.19 L/MIN (ref 58.5–79.14)
PRE PEF: 5.71 L/S (ref 3.25–6.46)
PRE RV: 2.16 L (ref 1.49–2.64)
PRE TLC: 3.99 L (ref 3.62–5.59)
RAW LLN: 3.06
RAW PRE REF: 227.5 %
RAW PRE: 6.96 CMH2O*S/L (ref 3.06–3.06)
RAW REF: 3.06
RV LLN: 1.49
RV PRE REF: 104.6 %
RV REF: 2.07
RVTLC LLN: 35
RVTLC PRE REF: 120.8 %
RVTLC PRE: 54.13 % (ref 35.21–54.39)
RVTLC REF: 45
TLC LLN: 3.62
TLC PRE REF: 86.7 %
TLC REF: 4.6
VA PRE: 3.55 L (ref 4.45–4.45)
VA SINGLE BREATH LLN: 4.45
VA SINGLE BREATH PRE REF: 79.7 %
VA SINGLE BREATH REF: 4.45
VC LLN: 1.74
VC PRE REF: 74.5 %
VC PRE: 1.83 L (ref 1.74–3.21)
VC REF: 2.46

## 2025-08-06 PROCEDURE — 94729 DIFFUSING CAPACITY: CPT | Mod: PBBFAC

## 2025-08-06 PROCEDURE — 94060 EVALUATION OF WHEEZING: CPT | Mod: PBBFAC

## 2025-08-06 PROCEDURE — 71048 X-RAY EXAM CHEST 4+ VIEWS: CPT | Mod: 26,,, | Performed by: RADIOLOGY

## 2025-08-06 PROCEDURE — 94726 PLETHYSMOGRAPHY LUNG VOLUMES: CPT | Mod: PBBFAC

## 2025-08-06 PROCEDURE — 71048 X-RAY EXAM CHEST 4+ VIEWS: CPT | Mod: TC

## 2025-08-18 ENCOUNTER — OFFICE VISIT (OUTPATIENT)
Dept: PULMONOLOGY | Facility: CLINIC | Age: 75
End: 2025-08-18
Payer: MEDICARE

## 2025-08-18 VITALS
RESPIRATION RATE: 14 BRPM | SYSTOLIC BLOOD PRESSURE: 111 MMHG | DIASTOLIC BLOOD PRESSURE: 68 MMHG | WEIGHT: 169.75 LBS | HEIGHT: 62 IN | OXYGEN SATURATION: 98 % | HEART RATE: 94 BPM | BODY MASS INDEX: 31.24 KG/M2

## 2025-08-18 DIAGNOSIS — R06.09 DYSPNEA ON EXERTION: Primary | Chronic | ICD-10-CM

## 2025-08-18 DIAGNOSIS — R05.3 CHRONIC COUGH: ICD-10-CM

## 2025-08-18 DIAGNOSIS — J84.9 INTERSTITIAL PULMONARY DISEASE, UNSPECIFIED: ICD-10-CM

## 2025-08-18 DIAGNOSIS — J40 BRONCHITIS: ICD-10-CM

## 2025-08-18 PROCEDURE — 99214 OFFICE O/P EST MOD 30 MIN: CPT | Mod: PBBFAC | Performed by: INTERNAL MEDICINE

## 2025-08-18 PROCEDURE — 99999 PR PBB SHADOW E&M-EST. PATIENT-LVL IV: CPT | Mod: PBBFAC,,, | Performed by: INTERNAL MEDICINE
